# Patient Record
Sex: FEMALE | Race: WHITE | NOT HISPANIC OR LATINO | Employment: UNEMPLOYED | URBAN - NONMETROPOLITAN AREA
[De-identification: names, ages, dates, MRNs, and addresses within clinical notes are randomized per-mention and may not be internally consistent; named-entity substitution may affect disease eponyms.]

---

## 2020-06-22 ENCOUNTER — OFFICE VISIT (OUTPATIENT)
Dept: OBSTETRICS AND GYNECOLOGY | Facility: CLINIC | Age: 25
End: 2020-06-22

## 2020-06-22 VITALS
WEIGHT: 209 LBS | HEIGHT: 66 IN | BODY MASS INDEX: 33.59 KG/M2 | DIASTOLIC BLOOD PRESSURE: 70 MMHG | SYSTOLIC BLOOD PRESSURE: 120 MMHG

## 2020-06-22 DIAGNOSIS — Z30.46 ENCOUNTER FOR REMOVAL AND REINSERTION OF NEXPLANON: Primary | ICD-10-CM

## 2020-06-22 PROCEDURE — 11983 REMOVE/INSERT DRUG IMPLANT: CPT | Performed by: NURSE PRACTITIONER

## 2020-06-22 NOTE — PROGRESS NOTES
Nexplanon Removal and Reinsertion    Patient's last menstrual period was 06/19/2020 (approximate).    Date of procedure:  6/22/2020    Risks and benefits discussed? yes  All questions answered? yes  Consents given by the patient  Written consent obtained? yes    Local anesthesia used:  3 cc's of lidocaine with epinephrine    Procedure documentation:    The upper left arm (non-dominant) was marked at the intended site of removal.  The skin was cleansed with an antiseptic solution.  Local anesthesia was injected.  A small incision was created at the distal tip of the implant.  The implant was removed intact without difficulty.    The new Nexplanon was placed subdermally without difficulty through the same incision used to remove the prior implant.  The devise was able to be palpated in the arm by both myself and Shanthi.  A clean 4x4 gauze was place over the site then wrapped.    She tolerated the procedure well.  There were no complications.  EBL was minimal.    Nexplanon  [id number]    Post procedure instructions: Remove the wrapping in 24 hours and cover with a band aid if still open.    Follow up needed: Annual gynecological exam     This note was electronically signed.    PRASANTH Medina  June 22, 2020

## 2023-03-24 ENCOUNTER — OFFICE VISIT (OUTPATIENT)
Dept: OBSTETRICS AND GYNECOLOGY | Facility: CLINIC | Age: 28
End: 2023-03-24
Payer: COMMERCIAL

## 2023-03-24 VITALS
HEIGHT: 66 IN | BODY MASS INDEX: 35.17 KG/M2 | WEIGHT: 218.8 LBS | SYSTOLIC BLOOD PRESSURE: 138 MMHG | DIASTOLIC BLOOD PRESSURE: 80 MMHG

## 2023-03-24 DIAGNOSIS — Z53.20 PAP SMEAR OF CERVIX DECLINED: Primary | ICD-10-CM

## 2023-03-24 DIAGNOSIS — Z30.2 REQUEST FOR STERILIZATION: ICD-10-CM

## 2023-03-24 DIAGNOSIS — Z30.09 ENCOUNTER FOR OTHER GENERAL COUNSELING OR ADVICE ON CONTRACEPTION: ICD-10-CM

## 2023-03-24 NOTE — PROGRESS NOTES
[facility]  Gynecology  Date of Service: 2023    CC: tubal consult    HPI  Shanthi Porter is a 27 y.o.  premenopausal female who presents with with desire for sterilization.    Nexplanon to be exchanged 2023  Sometimes 1-2 mos periods, then may skip months, takes ibuprofen/midol for cramping/back pain. Last 4-5 days. May 3 regular pads/tampons on heavier day.   Occasional pain with sex. Normal BM and urination     Patient states insurance expires end of month and had been hoping for tubal before then. Discussed with Medicaid insurance must be at least 30 days and must schedule which would likely be a couple of months.    Denies any vaginal itching, burning, irritation, or discharge. Denies any sexual dysfunction concerns. Not reporting any urinary symptoms including incontinence, dysuria, frequency, urgency, nocturia.    ROS  Review of Systems   Constitutional: Negative.    HENT: Negative.    Respiratory: Negative.    Cardiovascular: Negative.    Gastrointestinal: Negative.    Genitourinary: Negative.    Skin: Negative.    Psychiatric/Behavioral: Negative.        GYN HISTORY  Menarche: 9 yo  Menses: Somewhat irregular with Nexplanon  History of STIs: denies  Last pap smear: 2016  Last Completed Pap Smear     This patient has no relevant Health Maintenance data.        Abnormal pap smear history: denies, only 1 which was normal   Contraception: Nexplanon     OB HISTORY  OB History    Para Term  AB Living   1 1 1     1   SAB IAB Ectopic Molar Multiple Live Births             1      # Outcome Date GA Lbr Durga/2nd Weight Sex Delivery Anes PTL Lv   1 Term      Vag-Spont   HOLLIE    x1, term    PAST MEDICAL HISTORY  Past Medical History:   Diagnosis Date   • Other specified local infections of the skin and subcutaneous tissue     right ring finger tip        PAST SURGICAL HISTORY  History reviewed. No pertinent surgical history.  FAMILY HISTORY  Family History  "  Problem Relation Age of Onset   • Breast cancer Maternal Grandmother         50's   • Ovarian cancer Neg Hx    • Uterine cancer Neg Hx    • Colon cancer Neg Hx       MGM-br ca, around 50's  No ut/ov/colon ca    SOCIAL HISTORY  Social History     Socioeconomic History   • Marital status:    Tobacco Use   • Smoking status: Never   No tobacco/etoh/illicits    ALLERGIES  No Known Allergies     HOME MEDICATIONS  Prior to Admission medications    Not on File   No meds    PE  /80 (BP Location: Left arm, Patient Position: Sitting, Cuff Size: Adult)   Ht 167.6 cm (66\")   Wt 99.2 kg (218 lb 12.8 oz)   LMP 2023   BMI 35.32 kg/m²      General: Alert, healthy, no distress, well nourished and well developed.  Neurologic: Alert, oriented to person, place, and time.  Gait normal.  Cranial nerves II-XII grossly intact.  HEENT: Normocephalic, atraumatic.  Extraocular muscles intact.  Lungs: Normal respiratory effort.    Abdomen: Soft, non-tender, non-distended,no masses, no hepatosplenomegaly, no hernia.  Skin: No rash, no lesions.  Extremities: No cyanosis, clubbing or edema.  PELVIC EXAM:  Declined pap    IMPRESSION  Shanthi Porter is a 27 y.o.  presenting with desire for sterilization, concerned as insurance ending at end of month per patient.    PLAN    1. Pap smear of cervix declined  - Declined pap today; discussed need for pap smear  - Recommend going to health dept    2. Request for sterilization  - Patient concerned with insurance expiring, looking into other insurance options  - Discussed with Medicaid would need at least 30 days prior to tubal  - If unable to obtain insurance could consider going to health [facility] or [facility] first as may be able to obtain contraceptive, repeat LARC with pay scale for improved affordability  - Sterilization consent signed today, good for 6 mos    3. Encounter for other general counseling or advice on contraception  - Worried insurance would not cover " remove and replacement today; discussed option for obtaining other insurance and performing salpingectomy vs. Obtaining other contraceptive likely with health dept  - Patient will consider options               This document has been electronically signed by Karol Caballero DO on April 23, 2023 16:39 CDT